# Patient Record
Sex: MALE | Race: WHITE | Employment: FULL TIME | URBAN - METROPOLITAN AREA
[De-identification: names, ages, dates, MRNs, and addresses within clinical notes are randomized per-mention and may not be internally consistent; named-entity substitution may affect disease eponyms.]

---

## 2022-02-11 ENCOUNTER — HOSPITAL ENCOUNTER (OUTPATIENT)
Age: 52
Setting detail: OUTPATIENT SURGERY
Discharge: HOME | End: 2022-02-11
Attending: OTOLARYNGOLOGY | Admitting: OTOLARYNGOLOGY
Payer: COMMERCIAL

## 2022-02-11 VITALS
BODY MASS INDEX: 21.98 KG/M2 | TEMPERATURE: 97.8 F | SYSTOLIC BLOOD PRESSURE: 121 MMHG | RESPIRATION RATE: 16 BRPM | HEIGHT: 68 IN | OXYGEN SATURATION: 98 % | DIASTOLIC BLOOD PRESSURE: 74 MMHG | WEIGHT: 145 LBS | HEART RATE: 69 BPM

## 2022-02-11 LAB — SARS-COV-2, NAAT: NOT DETECTED

## 2022-02-11 PROCEDURE — 3600000002 HC SURGERY LEVEL 2 BASE: Performed by: OTOLARYNGOLOGY

## 2022-02-11 PROCEDURE — 88305 TISSUE EXAM BY PATHOLOGIST: CPT

## 2022-02-11 PROCEDURE — 87635 SARS-COV-2 COVID-19 AMP PRB: CPT

## 2022-02-11 PROCEDURE — 2709999900 HC NON-CHARGEABLE SUPPLY: Performed by: OTOLARYNGOLOGY

## 2022-02-11 PROCEDURE — 2580000003 HC RX 258: Performed by: OTOLARYNGOLOGY

## 2022-02-11 PROCEDURE — 6370000000 HC RX 637 (ALT 250 FOR IP): Performed by: OTOLARYNGOLOGY

## 2022-02-11 PROCEDURE — A4217 STERILE WATER/SALINE, 500 ML: HCPCS | Performed by: OTOLARYNGOLOGY

## 2022-02-11 PROCEDURE — 3600000012 HC SURGERY LEVEL 2 ADDTL 15MIN: Performed by: OTOLARYNGOLOGY

## 2022-02-11 PROCEDURE — 2500000003 HC RX 250 WO HCPCS: Performed by: OTOLARYNGOLOGY

## 2022-02-11 RX ORDER — GINSENG 100 MG
CAPSULE ORAL PRN
Status: DISCONTINUED | OUTPATIENT
Start: 2022-02-11 | End: 2022-02-11 | Stop reason: ALTCHOICE

## 2022-02-11 RX ORDER — MAGNESIUM HYDROXIDE 1200 MG/15ML
LIQUID ORAL CONTINUOUS PRN
Status: COMPLETED | OUTPATIENT
Start: 2022-02-11 | End: 2022-02-11

## 2022-02-11 RX ORDER — LIDOCAINE HYDROCHLORIDE AND EPINEPHRINE 10; 10 MG/ML; UG/ML
INJECTION, SOLUTION INFILTRATION; PERINEURAL PRN
Status: DISCONTINUED | OUTPATIENT
Start: 2022-02-11 | End: 2022-02-11 | Stop reason: ALTCHOICE

## 2022-02-11 NOTE — BRIEF OP NOTE
Brief Postoperative Note      Patient: Dominick Freeman  YOB: 1970  MRN: [id number]    Date of Procedure: 2/11/2022    Pre-Op Diagnosis: SKIN LESION LEFT EYELID    Post-Op Diagnosis: Same       Procedure(s):  EXCISION SKIN LESION LEFT EYELID WITH CLOSURE    Surgeon(s):  Blanche Murrieta MD    Assistant:  First Assistant: Criselda Moore    Anesthesia: Local    Estimated Blood Loss (mL): Minimal    Complications: None    Specimens:   ID Type Source Tests Collected by Time Destination   A : LEFT EYELID LESION Tissue Face SURGICAL PATHOLOGY Blanche Murrieta MD 2/11/2022 7310        Implants:  * No implants in log *      Drains: * No LDAs found *    Findings: lesion left lateral canthus    Electronically signed by Blanche Murrieta MD on 2/11/2022 at 8:40 AM

## 2022-02-11 NOTE — FLOWSHEET NOTE
Discharge instruction given to patient along with ointment. Verbalizes understanding with no further questions. Incision site cdi with steristrips on, zuleika. Patient being discharged.

## 2022-02-11 NOTE — DISCHARGE INSTR - DIET

## 2022-02-11 NOTE — DISCHARGE INSTR - COC
Continuity of Care Form    Patient Name: Christa Higgins   :  1970  MRN:  [id number]    Admit date:  2022  Discharge date:  ***    Code Status Order: No Order   Advance Directives:   Advance Care Flowsheet Documentation       Date/Time Healthcare Directive Type of Healthcare Directive Copy in [address] Agent's Name Healthcare Agent's Phone Number    [phone] No, patient does not have an advance directive for healthcare treatment -- -- -- -- --            Admitting Physician:  Ceci Singletary MD  PCP: Virginie Naranjo DO    Discharging Nurse: [facility] Unit/Room#: [facility] [address] Unit Phone Number: ***    Emergency Contact:   Extended Emergency Contact Information  Primary Emergency Contact: Tee Dickerson  Address: [address] [place] [address], [address] [place] [address] Phone: [phone]  Relation: Spouse    Past Surgical History:  Past Surgical History:   Procedure Laterality Date    TONSILLECTOMY      when he was 11years old       Immunization History: There is no immunization history on file for this patient. Active Problems: There is no problem list on file for this patient.       Isolation/Infection:   Isolation            No Isolation          Patient Infection Status       None to display            Nurse Assessment:  Last Vital Signs: /74   Pulse 69   Temp 97.8 °F (36.6 °C) (Temporal)   Resp 16   Ht 5' 8\" (1.727 m)   Wt 145 lb (65.8 kg)   SpO2 98%   BMI 22.05 kg/m²     Last documented pain score (0-10 scale):    Last Weight:   Wt Readings from Last 1 Encounters:   22 145 lb (65.8 kg)     Mental Status:  {IP PT MENTAL STATUS:}    IV Access:  {[facility] [place]:993646263}    Nursing Mobility/ADLs:  Walking   {CHP DME ELYI:307109899}  Transfer  {CHP DME FUJI:091922681}  Bathing  {CHP DME GFRT:562602879}  Dressing  {CHP DME FNJU:985101951}  Toileting  {CHP DME DVJV:524866101}  Feeding  {CHP DME IINY:464379815}  Med Admin  {CHP DME ORL}  Med Delivery   { GERARD MED Delivery:948746247}    Wound Care Documentation and Therapy:        Elimination:  Continence: Bowel: {YES / PF:01166}  Bladder: {YES / TH:24288}  Urinary Catheter: {Urinary Catheter:579831004}   Colostomy/Ileostomy/Ileal Conduit: {YES / AX:17972}       Date of Last BM: ***  No intake or output data in the 24 hours ending 22 0842  No intake/output data recorded.     Safety Concerns:     [address] Safety Concerns:297230753}    Impairments/Disabilities:      [address] Impairments/Disabilities:728965809}    Nutrition Therapy:  Current Nutrition Therapy:   [address] Diet List:509407285}    Routes of Feeding: {CHP DME Other Feedings:316552555}  Liquids: {Slp liquid thickness:61453}  Daily Fluid Restriction: {CHP DME Yes amt example:033494123}  Last Modified Barium Swallow with Video (Video Swallowing Test): {Done Not Done WSIU:790503260}    Treatments at the Time of Hospital Discharge:   Respiratory Treatments: ***  Oxygen Therapy:  {Therapy; copd oxygen:98974}  Ventilator:    { CC Vent OEOS:066668906}    Rehab Therapies: {THERAPEUTIC INTERVENTION:1082521151}  Weight Bearing Status/Restrictions: [address] [employer] Weight Bearin}  Other Medical Equipment (for information only, NOT a DME order):  {EQUIPMENT:093849589}  Other Treatments: ***    Patient's personal belongings (please select all that are sent with patient):  {CHP DME Belongings:398870751}    RN SIGNATURE:  {Esignature:529537928}    CASE MANAGEMENT/SOCIAL WORK SECTION    Inpatient Status Date: ***    Readmission Risk Assessment Score:  Readmission Risk              Risk of Unplanned Readmission:  0           Discharging to Facility/ Agency   Name:   Address:  Phone:  Fax:    Dialysis Facility (if applicable)   Name:  Address:  Dialysis Schedule:  Phone:  Fax:    / signature: {Esignature:634384510}    PHYSICIAN SECTION    Prognosis: {Prognosis:2919142905}    Condition at Discharge: Vicente Hernandez Patient Condition:153738206}    Rehab Potential (if transferring to Rehab): {Prognosis:3626935265}    Recommended Labs or Other Treatments After Discharge: ***    Physician Certification: I certify the above information and transfer of Tosin Michaud  is necessary for the continuing treatment of the diagnosis listed and that he requires {Admit to Appropriate Level of Care:77600} for {GREATER/LESS:335565166} 30 days.      Update Admission H&P: {CHP DME Changes in PWLK:299562585}    PHYSICIAN SIGNATURE:  {Esignature:364979058}

## 2022-02-14 NOTE — OP NOTE
[address]                      [address], [address] [facility]                                OPERATIVE REPORT    PATIENT NAME: Sandra Cano               :        1970  MED REC NO:   [id number]                            ROOM:  ACCOUNT NO:   [de-identified]                           ADMIT DATE: 2022  PROVIDER:     Sissy Kennedy MD    DATE OF PROCEDURE:  2022    PREOPERATIVE DIAGNOSIS:  Skin lesion left eyelid area near the lateral  canthus. POSTOPERATIVE DIAGNOSIS:  Skin lesion left eyelid area near the lateral  canthus. OPERATION:  Excision lesion left eyelid area with primary closure. SURGEON:  Sissy Kennedy MD    ANESTHESIA:  Local 1% lidocaine with 1:100,000 epinephrine. INDICATIONS FOR OPERATION:  This is a 49-year-old male who was evaluated  in the office with a history of a skin lesion left eyelid area. The  patient after evaluation was recommended this surgery. He has been  explained the alternatives, risks, complications, outcome, and informed  consent obtained. OPERATIVE PROCEDURE:  The patient was brought to the operating room and  placed in supine position. The patient draped and monitoring in  progress. Usual prep and drape was done of the left eyelid area with  the special solution for the prep near the eye. The skin incision was  marked out near the lateral canthus of the eye and approximately 1 cm  away from the actual lateral canthus. This area was injected with local  anesthesia. Sufficient time was allowed for _____ to take place. This  lesion was excised in toto with an elliptical incision. Hemostasis was  obtained. Undermining was done and subcuticular sutures were used and  closure was obtained. The procedure was now terminated. The patient  was sent home with instructions and will be followed in the office  within next 2 weeks.         Bernie Kern MD    D: 2022 15:56:46       T: 02/14/2022 15:59:06     [email or web address]  Job#: [id number]     Doc#: [id number]    CC:

## 2024-06-29 PROBLEM — H02.9 EYELID LESION: Status: ACTIVE | Noted: 2024-06-29

## 2024-07-05 ENCOUNTER — APPOINTMENT (OUTPATIENT)
Dept: PRIMARY CARE | Facility: CLINIC | Age: 54
End: 2024-07-05

## 2024-08-05 ENCOUNTER — APPOINTMENT (OUTPATIENT)
Dept: PRIMARY CARE | Facility: CLINIC | Age: 54
End: 2024-08-05
Payer: COMMERCIAL

## 2024-08-05 VITALS
HEART RATE: 58 BPM | HEIGHT: 68 IN | OXYGEN SATURATION: 96 % | TEMPERATURE: 98 F | SYSTOLIC BLOOD PRESSURE: 106 MMHG | BODY MASS INDEX: 22.58 KG/M2 | WEIGHT: 149 LBS | DIASTOLIC BLOOD PRESSURE: 82 MMHG

## 2024-08-05 DIAGNOSIS — Z00.00 ANNUAL PHYSICAL EXAM: Primary | ICD-10-CM

## 2024-08-05 DIAGNOSIS — Z76.89 ENCOUNTER TO ESTABLISH CARE WITH NEW DOCTOR: ICD-10-CM

## 2024-08-05 DIAGNOSIS — L82.1 SEBORRHEIC KERATOSIS: ICD-10-CM

## 2024-08-05 DIAGNOSIS — I25.10 CORONARY ARTERY DISEASE INVOLVING NATIVE CORONARY ARTERY OF NATIVE HEART WITHOUT ANGINA PECTORIS: ICD-10-CM

## 2024-08-05 DIAGNOSIS — Z12.11 COLON CANCER SCREENING: ICD-10-CM

## 2024-08-05 DIAGNOSIS — R07.89 OTHER CHEST PAIN: ICD-10-CM

## 2024-08-05 LAB
POC APPEARANCE, URINE: ABNORMAL
POC BILIRUBIN, URINE: NEGATIVE
POC BLOOD, URINE: NEGATIVE
POC COLOR, URINE: YELLOW
POC GLUCOSE, URINE: NEGATIVE MG/DL
POC KETONES, URINE: NEGATIVE MG/DL
POC LEUKOCYTES, URINE: NEGATIVE
POC NITRITE,URINE: NEGATIVE
POC PH, URINE: 7 PH
POC PROTEIN, URINE: NEGATIVE MG/DL
POC SPECIFIC GRAVITY, URINE: 1.02
POC UROBILINOGEN, URINE: 0.2 EU/DL

## 2024-08-05 PROCEDURE — 81002 URINALYSIS NONAUTO W/O SCOPE: CPT | Performed by: FAMILY MEDICINE

## 2024-08-05 PROCEDURE — 17110 DESTRUCTION B9 LES UP TO 14: CPT | Performed by: FAMILY MEDICINE

## 2024-08-05 PROCEDURE — 93000 ELECTROCARDIOGRAM COMPLETE: CPT | Performed by: FAMILY MEDICINE

## 2024-08-05 PROCEDURE — 99396 PREV VISIT EST AGE 40-64: CPT | Performed by: FAMILY MEDICINE

## 2024-08-05 RX ORDER — POLYETHYLENE GLYCOL 3350, SODIUM CHLORIDE, SODIUM BICARBONATE AND POTASSIUM CHLORIDE 420; 5.72; 11.2; 1.48 G/4L; G/4L; G/4L; G/4L
4000 POWDER, FOR SOLUTION ORAL ONCE
Qty: 4000 ML | Refills: 0 | Status: SHIPPED | OUTPATIENT
Start: 2024-08-05 | End: 2024-08-05

## 2024-08-05 ASSESSMENT — PATIENT HEALTH QUESTIONNAIRE - PHQ9
1. LITTLE INTEREST OR PLEASURE IN DOING THINGS: NOT AT ALL
2. FEELING DOWN, DEPRESSED OR HOPELESS: NOT AT ALL
SUM OF ALL RESPONSES TO PHQ9 QUESTIONS 1 AND 2: 0

## 2024-08-05 ASSESSMENT — ENCOUNTER SYMPTOMS
DECREASED CONCENTRATION: 0
SPEECH DIFFICULTY: 0
TROUBLE SWALLOWING: 0
RHINORRHEA: 0
NERVOUS/ANXIOUS: 0
BLOOD IN STOOL: 0
FLANK PAIN: 0
CONFUSION: 0
RECTAL PAIN: 0
CHEST TIGHTNESS: 0
STRIDOR: 0
DYSURIA: 0
DEPRESSION: 0
ARTHRALGIAS: 0
COUGH: 0
FATIGUE: 1
EYE PAIN: 0
APPETITE CHANGE: 0
LOSS OF SENSATION IN FEET: 0
PALPITATIONS: 0
ABDOMINAL DISTENTION: 0
SINUS PAIN: 0
HEADACHES: 0
ADENOPATHY: 0
DIZZINESS: 0
SLEEP DISTURBANCE: 0
SEIZURES: 0
CONSTIPATION: 0
AGITATION: 0
FEVER: 0
OCCASIONAL FEELINGS OF UNSTEADINESS: 0
POLYPHAGIA: 0
NECK STIFFNESS: 0
ABDOMINAL PAIN: 0
SHORTNESS OF BREATH: 0
PHOTOPHOBIA: 0
SINUS PRESSURE: 0
POLYDIPSIA: 0
SORE THROAT: 0
DYSPHORIC MOOD: 0
COLOR CHANGE: 1
HEMATURIA: 0
DIARRHEA: 0
MYALGIAS: 0
ACTIVITY CHANGE: 0

## 2024-08-05 ASSESSMENT — PAIN SCALES - GENERAL: PAINLEVEL: 0-NO PAIN

## 2024-08-05 NOTE — PATIENT INSTRUCTIONS
Follow up 2 weeks after ct cardiac score/lab    Continue current medications and therapy for chronic medical conditions.    Patient was advised importance of proper diet/nutrition in addition adequate hydration. Patient was encouraged moderate exercise program to include 30 minutes daily for 5 days of the week or 150 minutes weekly. Patient will follow-up with us as scheduled.    Obtain Fasting Lipid, CMP, CBC, PSA, TSH and vitamin D     Complete Colonoscopy     IO EKG today     Cryosurgery for treatment of keratosis upper back    Obtain CT cardiac score    IO UA today     Schedule CT cardiac score

## 2024-08-05 NOTE — PROGRESS NOTES
Subjective   Patient ID: Clarke Ramirez is a 53 y.o. male who presents for \[facility]\"" Care.    HPI   Patient present to [facility] care   Seen Dr. Segovia in the past, 12 years ago  Recent PCP is Dr. 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 Kidd at [facility], surgical pathology available in chart   Follow up scheduled tomorrow 2024  Fam hx includes brother  from liver cancer and maternal grandmother  from colon cancer     States May 2024 he was at a concert and suddenly experienced chest pain, vomiting, and dizziness possibly d/t heat exhaustion. States he went to bed and woke up the next morning feeling better. Denies any episodes since then.     Review of Systems   Constitutional:  Positive for fatigue. Negative for activity change, appetite change and fever.   HENT:  Negative for congestion, dental problem, ear discharge, ear pain, mouth sores, rhinorrhea, sinus pressure, sinus pain, sore throat, tinnitus and trouble swallowing.    Eyes:  Negative for photophobia, pain and visual disturbance.   Respiratory:  Negative for cough, chest tightness, shortness of breath and stridor.    Cardiovascular:  Positive for chest pain (intermittent). Negative for palpitations and leg swelling.   Gastrointestinal:  Negative for abdominal distention, abdominal pain, blood in stool, constipation, diarrhea and rectal pain.   Endocrine: Negative for cold intolerance, heat intolerance, polydipsia, polyphagia and polyuria.   Genitourinary:  Negative for dysuria, flank pain, hematuria and urgency.   Musculoskeletal:  Negative for arthralgias, gait problem, myalgias and neck stiffness.   Skin:  Positive for color change. Negative for  "rash.   Allergic/Immunologic: Negative for environmental allergies and food allergies.   Neurological:  Negative for dizziness, seizures, syncope, speech difficulty and headaches.   Hematological:  Negative for adenopathy.   Psychiatric/Behavioral:  Negative for agitation, confusion, decreased concentration, dysphoric mood and sleep disturbance. The patient is not nervous/anxious.        Objective   /82 (BP Location: Right arm, Patient Position: Sitting, BP Cuff Size: Adult)   Pulse 58   Temp 36.7 °C (98 °F)   Ht 1.727 m (5' 8\")   Wt 67.6 kg (149 lb)   SpO2 96%   BMI 22.66 kg/m²     Physical Exam  Vitals reviewed.   Constitutional:       General: He is not in acute distress.     Appearance: Normal appearance. He is normal weight. He is not ill-appearing or diaphoretic.   HENT:      Head: Normocephalic.      Right Ear: Tympanic membrane and external ear normal.      Left Ear: Tympanic membrane and external ear normal.      Nose: Nose normal. No congestion.      Mouth/Throat:      Pharynx: No posterior oropharyngeal erythema.   Eyes:      General:         Right eye: No discharge.         Left eye: No discharge.      Extraocular Movements: Extraocular movements intact.      Conjunctiva/sclera: Conjunctivae normal.      Pupils: Pupils are equal, round, and reactive to light.   Cardiovascular:      Rate and Rhythm: Normal rate and regular rhythm.      Pulses: Normal pulses.      Heart sounds: Normal heart sounds. No murmur heard.  Pulmonary:      Effort: Pulmonary effort is normal. No respiratory distress.      Breath sounds: Normal breath sounds. No wheezing or rales.   Chest:      Chest wall: No tenderness.   Abdominal:      General: Abdomen is flat. Bowel sounds are normal. There is no distension.      Palpations: There is no mass.      Tenderness: There is no abdominal tenderness. There is no guarding.   Musculoskeletal:         General: No tenderness. Normal range of motion.      Cervical back: Normal " range of motion and neck supple. No tenderness.      Right lower leg: No edema.      Left lower leg: No edema.   Skin:     General: Skin is dry.      Coloration: Skin is not jaundiced.      Findings: Lesion present. No bruising, erythema or rash.      Comments: Seborrheic keratosis approximately 2.0 x 2.5 cm to mid upper back with no localized erythema   Neurological:      General: No focal deficit present.      Mental Status: He is alert and oriented to person, place, and time. Mental status is at baseline.      Cranial Nerves: No cranial nerve deficit.      Sensory: No sensory deficit.      Coordination: Coordination normal.      Gait: Gait normal.   Psychiatric:         Mood and Affect: Mood normal.         Thought Content: Thought content normal.         Judgment: Judgment normal.         Assessment/Plan   Problem List Items Addressed This Visit             ICD-10-CM    Coronary artery disease involving native coronary artery of native heart without angina pectoris I25.10    Relevant Orders    CT cardiac scoring wo IV contrast     Other Visit Diagnoses         Codes    Annual physical exam    -  Primary Z00.00    Relevant Orders    CBC and Auto Differential    Comprehensive Metabolic Panel    Lipid Panel    Prostate Specific Antigen, Screen    Vitamin D 25-Hydroxy,Total (for eval of Vitamin D levels)    Thyroid Stimulating Hormone    Encounter to establish care with new doctor     Z76.89    Colon cancer screening     Z12.11    Relevant Orders    Colonoscopy Screening; Average Risk Patient    Other chest pain     R07.89    Relevant Orders    ECG 12 lead (Clinic Performed)    POCT UA (nonautomated) manually resulted (Completed)    Seborrheic keratosis     L82.1              Scribe Attestation  By signing my name below, Diana ROJAS RMA , Scrdylan   attest that this documentation has been prepared under the direction and in the presence of Miguel Ángel Segovia DO.   Provider Attestation - Scribe  documentation    All medical record entries made by the Scribe were at my direction and personally dictated by me. I have reviewed the chart and agree that the record accurately reflects my personal performance of the history, physical exam, discussion and plan.    Patient ID: Clarke Ramirez is a 53 y.o. male.    Destruction of lesion    Date/Time: 8/5/2024 11:06 AM    Performed by: Miguel Ángel Segovia DO  Authorized by: Miguel Ángel Segovia DO    Number of Lesions: 1  Lesion 1:     Body area: trunk    Trunk location: back    Malignancy: benign lesion      Destruction method: cryotherapy

## 2024-08-28 ENCOUNTER — LAB (OUTPATIENT)
Dept: LAB | Facility: LAB | Age: 54
End: 2024-08-28
Payer: COMMERCIAL

## 2024-08-28 DIAGNOSIS — Z00.00 ANNUAL PHYSICAL EXAM: ICD-10-CM

## 2024-08-28 LAB
25(OH)D3 SERPL-MCNC: 21 NG/ML (ref 30–100)
ALBUMIN SERPL BCP-MCNC: 4.1 G/DL (ref 3.4–5)
ALP SERPL-CCNC: 47 U/L (ref 33–120)
ALT SERPL W P-5'-P-CCNC: 19 U/L (ref 10–52)
ANION GAP SERPL CALC-SCNC: 11 MMOL/L (ref 10–20)
AST SERPL W P-5'-P-CCNC: 15 U/L (ref 9–39)
BASOPHILS # BLD AUTO: 0.06 X10*3/UL (ref 0–0.1)
BASOPHILS NFR BLD AUTO: 0.8 %
BILIRUB SERPL-MCNC: 0.4 MG/DL (ref 0–1.2)
BUN SERPL-MCNC: 15 MG/DL (ref 6–23)
CALCIUM SERPL-MCNC: 9.7 MG/DL (ref 8.6–10.3)
CHLORIDE SERPL-SCNC: 105 MMOL/L (ref 98–107)
CHOLEST SERPL-MCNC: 161 MG/DL (ref 0–199)
CHOLESTEROL/HDL RATIO: 4.5
CO2 SERPL-SCNC: 30 MMOL/L (ref 21–32)
CREAT SERPL-MCNC: 0.97 MG/DL (ref 0.5–1.3)
EGFRCR SERPLBLD CKD-EPI 2021: >90 ML/MIN/1.73M*2
EOSINOPHIL # BLD AUTO: 0.12 X10*3/UL (ref 0–0.7)
EOSINOPHIL NFR BLD AUTO: 1.6 %
ERYTHROCYTE [DISTWIDTH] IN BLOOD BY AUTOMATED COUNT: 11.9 % (ref 11.5–14.5)
GLUCOSE SERPL-MCNC: 87 MG/DL (ref 74–99)
HCT VFR BLD AUTO: 42.5 % (ref 41–52)
HDLC SERPL-MCNC: 35.7 MG/DL
HGB BLD-MCNC: 14.2 G/DL (ref 13.5–17.5)
IMM GRANULOCYTES # BLD AUTO: 0.03 X10*3/UL (ref 0–0.7)
IMM GRANULOCYTES NFR BLD AUTO: 0.4 % (ref 0–0.9)
LDLC SERPL CALC-MCNC: 107 MG/DL
LYMPHOCYTES # BLD AUTO: 2.69 X10*3/UL (ref 1.2–4.8)
LYMPHOCYTES NFR BLD AUTO: 35.3 %
MCH RBC QN AUTO: 32 PG (ref 26–34)
MCHC RBC AUTO-ENTMCNC: 33.4 G/DL (ref 32–36)
MCV RBC AUTO: 96 FL (ref 80–100)
MONOCYTES # BLD AUTO: 0.58 X10*3/UL (ref 0.1–1)
MONOCYTES NFR BLD AUTO: 7.6 %
NEUTROPHILS # BLD AUTO: 4.13 X10*3/UL (ref 1.2–7.7)
NEUTROPHILS NFR BLD AUTO: 54.3 %
NON HDL CHOLESTEROL: 125 MG/DL (ref 0–149)
NRBC BLD-RTO: 0 /100 WBCS (ref 0–0)
PLATELET # BLD AUTO: 283 X10*3/UL (ref 150–450)
POTASSIUM SERPL-SCNC: 5.4 MMOL/L (ref 3.5–5.3)
PROT SERPL-MCNC: 6.4 G/DL (ref 6.4–8.2)
PSA SERPL-MCNC: 0.46 NG/ML
RBC # BLD AUTO: 4.44 X10*6/UL (ref 4.5–5.9)
SODIUM SERPL-SCNC: 141 MMOL/L (ref 136–145)
TRIGL SERPL-MCNC: 93 MG/DL (ref 0–149)
TSH SERPL-ACNC: 0.41 MIU/L (ref 0.44–3.98)
VLDL: 19 MG/DL (ref 0–40)
WBC # BLD AUTO: 7.6 X10*3/UL (ref 4.4–11.3)

## 2024-08-28 PROCEDURE — 84153 ASSAY OF PSA TOTAL: CPT

## 2024-08-28 PROCEDURE — 80061 LIPID PANEL: CPT

## 2024-08-28 PROCEDURE — 80053 COMPREHEN METABOLIC PANEL: CPT

## 2024-08-28 PROCEDURE — 85025 COMPLETE CBC W/AUTO DIFF WBC: CPT

## 2024-08-28 PROCEDURE — 84443 ASSAY THYROID STIM HORMONE: CPT

## 2024-08-28 PROCEDURE — 82306 VITAMIN D 25 HYDROXY: CPT

## 2024-08-28 PROCEDURE — 36415 COLL VENOUS BLD VENIPUNCTURE: CPT

## 2024-08-31 ENCOUNTER — HOSPITAL ENCOUNTER (OUTPATIENT)
Dept: RADIOLOGY | Facility: CLINIC | Age: 54
Discharge: HOME | End: 2024-08-31
Payer: COMMERCIAL

## 2024-08-31 DIAGNOSIS — I25.10 CORONARY ARTERY DISEASE INVOLVING NATIVE CORONARY ARTERY OF NATIVE HEART WITHOUT ANGINA PECTORIS: ICD-10-CM

## 2024-08-31 PROCEDURE — 75571 CT HRT W/O DYE W/CA TEST: CPT

## 2024-09-03 ENCOUNTER — TELEPHONE (OUTPATIENT)
Dept: PRIMARY CARE | Facility: CLINIC | Age: 54
End: 2024-09-03
Payer: COMMERCIAL

## 2024-09-04 ENCOUNTER — APPOINTMENT (OUTPATIENT)
Dept: PRIMARY CARE | Facility: CLINIC | Age: 54
End: 2024-09-04
Payer: COMMERCIAL

## 2024-09-04 DIAGNOSIS — E78.5 DYSLIPIDEMIA: ICD-10-CM

## 2024-09-04 DIAGNOSIS — Z71.2 ENCOUNTER TO DISCUSS TEST RESULTS: Primary | ICD-10-CM

## 2024-09-04 DIAGNOSIS — E55.9 VITAMIN D DEFICIENCY: ICD-10-CM

## 2024-09-04 PROCEDURE — 99442 PR PHYS/QHP TELEPHONE EVALUATION 11-20 MIN: CPT | Performed by: FAMILY MEDICINE

## 2024-09-04 RX ORDER — CHOLECALCIFEROL (VITAMIN D3) 50 MCG
50 TABLET ORAL DAILY
Qty: 90 TABLET | Refills: 1 | Status: SHIPPED | OUTPATIENT
Start: 2024-09-04 | End: 2025-09-04

## 2024-09-04 ASSESSMENT — ENCOUNTER SYMPTOMS
DYSPHORIC MOOD: 0
SHORTNESS OF BREATH: 0
APPETITE CHANGE: 0
CONSTITUTIONAL NEGATIVE: 1
FATIGUE: 0
BLOOD IN STOOL: 0
SINUS PRESSURE: 0
SINUS PAIN: 0
SPEECH DIFFICULTY: 0
FEVER: 0
TROUBLE SWALLOWING: 0
FLANK PAIN: 0
ARTHRALGIAS: 0
ABDOMINAL DISTENTION: 0
NERVOUS/ANXIOUS: 0
CONFUSION: 0
HEADACHES: 0
NECK STIFFNESS: 0
POLYPHAGIA: 0
HEMATURIA: 0
CHEST TIGHTNESS: 0
MYALGIAS: 0
RHINORRHEA: 0
SLEEP DISTURBANCE: 0
EYE PAIN: 0
DIZZINESS: 0
SEIZURES: 0
ACTIVITY CHANGE: 0
DYSURIA: 0
DIARRHEA: 0
COLOR CHANGE: 0
PHOTOPHOBIA: 0
STRIDOR: 0
ABDOMINAL PAIN: 0
PALPITATIONS: 0
POLYDIPSIA: 0
AGITATION: 0
DECREASED CONCENTRATION: 0
CONSTIPATION: 0
COUGH: 0
RECTAL PAIN: 0
ADENOPATHY: 0
SORE THROAT: 0

## 2024-09-04 NOTE — PATIENT INSTRUCTIONS
Follow up in 3 months    Continue current medications and therapy for chronic medical conditions.    Patient was advised importance of proper diet/nutrition in addition adequate hydration. Patient was encouraged moderate exercise program to include 30 minutes daily for 5 days of the week or 150 minutes weekly. Patient will follow-up with us as scheduled.    Review CT cardiac score from August 2024    Review lab results from August 2024    Start vitamin D 2000 international units daily

## 2024-09-04 NOTE — PROGRESS NOTES
Subjective   Patient ID: Clarke Ramirez is a 53 y.o. male who presents for Results.    Consent obtained by Clarke Ramirez for audio communication. Total time for this audio communication visit is 12 minutes.     HPI   The patient is in for his VV to go over ct results.    The patient prefers a phone call.     Review of Systems   Constitutional: Negative.  Negative for activity change, appetite change, fatigue and fever.   HENT:  Negative for congestion, dental problem, ear discharge, ear pain, mouth sores, rhinorrhea, sinus pressure, sinus pain, sore throat, tinnitus and trouble swallowing.    Eyes:  Negative for photophobia, pain and visual disturbance.   Respiratory:  Negative for cough, chest tightness, shortness of breath and stridor.    Cardiovascular:  Negative for chest pain and palpitations.   Gastrointestinal:  Negative for abdominal distention, abdominal pain, blood in stool, constipation, diarrhea and rectal pain.   Endocrine: Negative for cold intolerance, heat intolerance, polydipsia, polyphagia and polyuria.   Genitourinary:  Negative for dysuria, flank pain, hematuria and urgency.   Musculoskeletal:  Negative for arthralgias, gait problem, myalgias and neck stiffness.   Skin:  Negative for color change and rash.   Allergic/Immunologic: Negative for environmental allergies and food allergies.   Neurological:  Negative for dizziness, seizures, syncope, speech difficulty and headaches.   Hematological:  Negative for adenopathy.   Psychiatric/Behavioral:  Negative for agitation, confusion, decreased concentration, dysphoric mood and sleep disturbance. The patient is not nervous/anxious.        Objective   There were no vitals taken for this visit.    Physical Exam  Neurological:      Mental Status: He is alert and oriented to person, place, and time.   Psychiatric:         Mood and Affect: Mood normal.         Behavior: Behavior normal.         Thought Content: Thought content normal.        Constitutional: Well developed, well nourished, alert and in no acute distress   Psychiatric: Mood calm and affect normal    Telephone Visit - Audio Communication Only     Assessment/Plan   Problem List Items Addressed This Visit    None  Visit Diagnoses         Codes    Encounter to discuss test results    -  Primary Z71.2    Dyslipidemia     E78.5    Vitamin D deficiency     E55.9    Relevant Medications    cholecalciferol (Vitamin D-3) 50 MCG (2000 UT) tablet          Consent obtained by Clarke Ramirez for audio communication. Total time for this audio communication visit is 12 minutes.        Scribe Attestation  By signing my name below, I, ESCOBAR Littlejohn , Alibe   attest that this documentation has been prepared under the direction and in the presence of Miguel Ángel Segovia DO.   Provider Attestation - Scribe documentation    All medical record entries made by the Scribe were at my direction and personally dictated by me. I have reviewed the chart and agree that the record accurately reflects my personal performance of the history, physical exam, discussion and plan.

## 2024-10-18 PROBLEM — R05.3 PERSISTENT COUGH: Status: ACTIVE | Noted: 2024-10-18

## 2024-10-18 PROBLEM — J20.9 ACUTE BRONCHITIS: Status: ACTIVE | Noted: 2024-10-18

## 2024-10-18 PROBLEM — R06.09 DYSPNEA ON EXERTION: Status: ACTIVE | Noted: 2024-10-18

## 2024-11-01 ENCOUNTER — CLINICAL SUPPORT (OUTPATIENT)
Dept: PREADMISSION TESTING | Facility: HOSPITAL | Age: 54
End: 2024-11-01
Payer: COMMERCIAL

## 2024-11-01 VITALS — WEIGHT: 155 LBS | BODY MASS INDEX: 23.49 KG/M2 | HEIGHT: 68 IN

## 2024-11-01 RX ORDER — NAPROXEN 250 MG/1
500 TABLET ORAL 2 TIMES DAILY PRN
COMMUNITY

## 2024-11-01 NOTE — PREPROCEDURE INSTRUCTIONS

## 2024-11-05 ENCOUNTER — ANESTHESIA EVENT (OUTPATIENT)
Dept: GASTROENTEROLOGY | Facility: HOSPITAL | Age: 54
End: 2024-11-05
Payer: COMMERCIAL

## 2024-11-05 NOTE — ANESTHESIA PREPROCEDURE EVALUATION
Patient: Clarke Ramirez    Procedure Information       Date/Time: 11/06/24 1000    Scheduled providers: Joselo No MD; German Rodriguez DO    Procedure: COLONOSCOPY    Location: [facility]            Relevant Problems   Cardiac   (+) Coronary artery disease involving native coronary artery of native heart without angina pectoris      Pulmonary   (+) Dyspnea on exertion       Clinical information reviewed:                   NPO Detail:  No data recorded     Physical Exam    Airway  Mallampati: II     Cardiovascular   Rhythm: regular  Rate: normal     Dental    Pulmonary - normal exam     Abdominal - normal exam             Anesthesia Plan    History of general anesthesia?: yes  History of complications of general anesthesia?: no    ASA 3     MAC     intravenous induction   Postoperative administration of opioids is intended.  Anesthetic plan and risks discussed with patient.

## 2024-11-06 ENCOUNTER — HOSPITAL ENCOUNTER (OUTPATIENT)
Dept: GASTROENTEROLOGY | Facility: HOSPITAL | Age: 54
Setting detail: OUTPATIENT SURGERY
Discharge: HOME | End: 2024-11-06
Payer: COMMERCIAL

## 2024-11-06 ENCOUNTER — ANESTHESIA (OUTPATIENT)
Dept: GASTROENTEROLOGY | Facility: HOSPITAL | Age: 54
End: 2024-11-06
Payer: COMMERCIAL

## 2024-11-06 VITALS
WEIGHT: 144.4 LBS | BODY MASS INDEX: 21.89 KG/M2 | OXYGEN SATURATION: 100 % | SYSTOLIC BLOOD PRESSURE: 123 MMHG | TEMPERATURE: 97 F | DIASTOLIC BLOOD PRESSURE: 84 MMHG | HEART RATE: 72 BPM | HEIGHT: 68 IN | RESPIRATION RATE: 17 BRPM

## 2024-11-06 DIAGNOSIS — Z12.11 COLON CANCER SCREENING: ICD-10-CM

## 2024-11-06 PROCEDURE — 7100000009 HC PHASE TWO TIME - INITIAL BASE CHARGE

## 2024-11-06 PROCEDURE — 45380 COLONOSCOPY AND BIOPSY: CPT | Performed by: INTERNAL MEDICINE

## 2024-11-06 PROCEDURE — 2500000004 HC RX 250 GENERAL PHARMACY W/ HCPCS (ALT 636 FOR OP/ED): Performed by: NURSE ANESTHETIST, CERTIFIED REGISTERED

## 2024-11-06 PROCEDURE — 7100000010 HC PHASE TWO TIME - EACH INCREMENTAL 1 MINUTE

## 2024-11-06 PROCEDURE — 3700000002 HC GENERAL ANESTHESIA TIME - EACH INCREMENTAL 1 MINUTE

## 2024-11-06 PROCEDURE — 3700000001 HC GENERAL ANESTHESIA TIME - INITIAL BASE CHARGE

## 2024-11-06 RX ORDER — PROPOFOL 10 MG/ML
INJECTION, EMULSION INTRAVENOUS AS NEEDED
Status: DISCONTINUED | OUTPATIENT
Start: 2024-11-06 | End: 2024-11-06

## 2024-11-06 ASSESSMENT — PAIN SCALES - GENERAL
PAINLEVEL_OUTOF10: 0 - NO PAIN
PAIN_LEVEL: 0

## 2024-11-06 ASSESSMENT — PAIN - FUNCTIONAL ASSESSMENT
PAIN_FUNCTIONAL_ASSESSMENT: 0-10

## 2024-11-06 ASSESSMENT — COLUMBIA-SUICIDE SEVERITY RATING SCALE - C-SSRS
2. HAVE YOU ACTUALLY HAD ANY THOUGHTS OF KILLING YOURSELF?: NO
6. HAVE YOU EVER DONE ANYTHING, STARTED TO DO ANYTHING, OR PREPARED TO DO ANYTHING TO END YOUR LIFE?: NO
1. IN THE PAST MONTH, HAVE YOU WISHED YOU WERE DEAD OR WISHED YOU COULD GO TO SLEEP AND NOT WAKE UP?: NO

## 2024-11-06 NOTE — H&P
"Outpatient Hospital Procedure H&P    Patient Profile-Procedures  Initial Info  Patient Demographics  Name Clarke Ramirez  Date of Birth 1970  MRN [id number]  Address   [address] [place] [id number] [place] [place] [address]    Primary Phone Number [phone]  Secondary Phone Number    PCP Mayo Doshi    Procedure(s):  Colonoscopy  Primary contact name and number   Extended Emergency Contact Information  Primary Emergency Contact: Carrie Ramirez  Home Phone: [phone]  Relation: Spouse    General Health  Weight   Vitals:    11/06/24 0854   Weight: 65.5 kg (144 lb 6.4 oz)     BMI Body mass index is 21.96 kg/m².    Allergies  Allergies   Allergen Reactions    Bee Venom Protein (Honey Bee) Swelling       Past Medical History   Past Medical History:   Diagnosis Date    Allergic seasonal    Angina pectoris     was evaluated for \"chest pain,\" and given \"clean bill of health.\"    Chronic pain disorder     lower back    Fractures     left femur when 5 years old, broken foot in past (not sure of laterality), left 5th finger    Skin cancer of face     above left eye-cancer was removed    Wears glasses     reading only       Provider assessment  Diagnosis: Screening   Medication Reviewed - yes  Prior to Admission medications    Medication Sig Start Date End Date Taking? Authorizing Provider   cholecalciferol (Vitamin D-3) 50 MCG (2000 UT) tablet Take 1 tablet (50 mcg) by mouth once daily.  Patient not taking: Reported on 11/6/2024 9/4/24 9/4/25  Miguel Ángel Segovia,    naproxen (Naprosyn) 250 mg tablet Take 2 tablets (500 mg) by mouth 2 times a day as needed for mild pain (1 - 3).  Patient not taking: Reported on 11/6/2024    Historical Provider, MD       Physical Exam  Vitals:    11/06/24 0854   BP: 109/68   Pulse: 72   Resp: 16   Temp: 36.7 °C (98.1 °F)   SpO2: 98%        General: A&Ox3, NAD.  CV: RRR. No murmur.  Resp: CTA bilaterally. No wheezing, rhonchi or rales.   Extrem: No edema. "       Oropharyngeal Classification II (hard and soft palate, upper portion of tonsils and uvula visible)  ASA PS Classification 2  Sedation Plan Deep  Procedure Plan - pre-procedural (re)assesment completed by physician:  discharge/transfer patient when discharge criteria met    Joselo No MD  11/6/2024 9:05 AM

## 2024-11-06 NOTE — DISCHARGE INSTRUCTIONS
Patient Instructions after a Colonoscopy      The anesthetics, sedatives or narcotics which were given to you today will be acting in your body for the next 24 hours, so you might feel a little sleepy or groggy.  This feeling should slowly wear off. Carefully read and follow the instructions.     You received sedation today:  - Do not drive or operate any machinery or power tools of any kind.   - No alcoholic beverages today, not even beer or wine.  - Do not make any important decisions or sign any legal documents.  - No over the counter medications that contain alcohol or that may cause drowsiness.  -  While it is common to experience mild to moderate abdominal distention, gas, or belching after your procedure, if any of these symptoms occur following discharge from the GI Lab or within one week of having your procedure, call the [facility] to be advised whether a visit to your nearest Urgent Care or Emergency Department is indicated.  Take this paper with you if you go.     - If you develop an allergic reaction to the medications that were given during your procedure such as difficulty breathing, rash, hives, severe nausea, vomiting or lightheadedness.  - If you experience chest pain, shortness of breath, severe abdominal pain, fevers and chills.  -If you develop signs and symptoms of bleeding such as blood in your spit, if your stools turn black, tarry, or bloody  - If you have not urinated within 8 hours following your procedure.  - If your IV site becomes painful, red, inflamed, or looks infected.    If you received a biopsy/polypectomy/sphincterotomy the following instructions apply below:    __ Do not use Aspirin containing products, non-steroidal medications or anti-coagulants for one week following your procedure. (Examples of these types of medications are: Advil, Arthrotec, Aleve, Coumadin, Ecotrin, Heparin, Ibuprofen, Indocin, Motrin, Naprosyn, Nuprin, Plavix, Vioxx, and Voltarin, or  their generic forms.  This list is not all-inclusive.  Check with your physician or pharmacist before resuming medications.)   __ Eat a soft diet today.  Avoid foods that are poorly digested for the next 24 hours.  These foods would include: nuts, beans, lettuce, red meats, and fried foods. Start with liquids and advance your diet as tolerated, gradually work up to eating solids.   __ Do not have a Barium Study or Enema for one week.    Your physician recommends the additional following instructions:    -You have a contact number available for emergencies. The signs and symptoms of potential delayed complications were discussed with you. You may return to normal activities tomorrow.  -Resume your previous diet.  -Continue your present medications.   -We are waiting for your pathology results.  -Your physician has recommended a repeat colonoscopy (date to be determined after pending pathology results are reviewed) for surveillance based on pathology results.  -The findings and recommendations have been discussed with you.  -The findings and recommendations were discussed with your family.  - Please see Medication Reconciliation Form for new medication/medications prescribed.     Moderate Sedation in Adults Discharge Instructions    About this topic  Moderate sedation is also known as conscious sedation. It changes your state of being awake or consciousness. With this sedation, you may feel slight pain or pressure during a procedure. The drugs help you to relax and may even allow you to sleep. It will be easy to wake you and you may talk and answer questions while under sedation. Most likely, you will not remember what happens while under this sedation.  What care is needed at home?  Ask your doctor what you need to do when you go home. Make sure you understand everything the doctor says. This way you will know what you need to do.  You will not be allowed to drive right away after the procedure. Ask a family member  or a friend to drive you home.  Do not operate heavy or dangerous machinery for at least 24 hours.  Do not make major decisions or sign important papers for at least 24 hours. You may not be thinking clearly.  Avoid beer, wine, or mixed drinks (alcohol) for at least 24 hours.  You are at a higher risk of falling for at least 24 hours after moderate sedation.  Take extra care when you get up.  Do not change positions quickly.  Do not rush when you need to go to the bathroom or to answer the phone.  Ask for help if you feel unsteady when you try to walk.  Wear shoes with non-slip soles and low heels.  What follow-up care is needed?  Your doctor may ask you to make visits to the office to check on your progress. Be sure to keep these visits. Your doctor may also refer you to other doctors or tell you that you need more tests or care.  What drugs may be needed?  The doctor may order drugs to:  Help with pain  Treat an upset stomach or throwing up  Will physical activity be limited?  Rest for the day of the procedure. Avoid strenuous activities like heavy lifting and hard exercise. Talk to your doctor about whether you need to limit lifting or exercise after your procedure.  What changes to diet are needed?  Start with a light diet when you are fully awake. This includes things that are easy to swallow like soups, pudding, jello, toast, and eggs. Slowly progress to your normal diet.  What problems could happen?  Low blood pressure  Breathing problems  Upset stomach or throwing up  Dizziness  When do I need to call the doctor?  Feel dizzy, weak, or tired  Faint  Very bad headache  Upset stomach or throwing up  To follow up for more tests or care  Teach Back: Helping You Understand  The Teach Back Method helps you understand the information we are giving you. After you talk with the staff, tell them in your own words what you learned. This helps to make sure the staff has described each thing clearly. It also helps to  explain things that may have been confusing. Before going home, make sure you can do these:  I can tell you about my procedure.  I can tell you if I need more tests or care.  I can tell you what is good for me to eat and drink the next day.  I can tell you what I would do if I feel dizzy, weak, or tired.  Last Reviewed Date  2020-03-02    Physician phone number provided to patient upon discharge

## 2024-11-06 NOTE — Clinical Note
Huddle and Timeout completed together with team. Patient wristband and BEE information verified.  Anesthesia safety check completed

## 2024-11-06 NOTE — Clinical Note
Patient tolerated procedure well. Appears comfortable with no complaints of pain. VS stable. Arousable prior to transport. Patient transported to [facility] via cart.  Report called              . Handoff completed

## 2024-11-06 NOTE — ANESTHESIA POSTPROCEDURE EVALUATION
Patient: Clarke Ramirez    Procedure Summary       Date: 11/06/24 Room / Location: [facility]    Anesthesia Start: 1008 Anesthesia Stop: 1034    Procedure: COLONOSCOPY Diagnosis: Colon cancer screening    Scheduled Providers: Joselo No MD; German Rodriguez DO; Suzie Omalley RN; Mihaela Laguna Responsible Provider: German Rodriguez DO    Anesthesia Type: MAC ASA Status: 3            Anesthesia Type: MAC    Vitals Value Taken Time   BP 98/64 11/06/24 1034   Temp 36.5 11/06/24 1034   Pulse 72 11/06/24 1034   Resp 18 11/06/24 1034   SpO2 100 11/06/24 1034       Anesthesia Post Evaluation    Patient location during evaluation: bedside  Patient participation: complete - patient participated  Level of consciousness: awake and alert  Pain score: 0  Pain management: adequate  Airway patency: patent  Cardiovascular status: acceptable and stable  Respiratory status: acceptable and room air  Hydration status: acceptable  Postoperative Nausea and Vomiting: none      No notable events documented.

## 2024-11-13 LAB
LABORATORY COMMENT REPORT: NORMAL
PATH REPORT.FINAL DX SPEC: NORMAL
PATH REPORT.GROSS SPEC: NORMAL
PATH REPORT.RELEVANT HX SPEC: NORMAL
PATH REPORT.TOTAL CANCER: NORMAL

## 2025-08-07 ENCOUNTER — OFFICE VISIT (OUTPATIENT)
Dept: PRIMARY CARE | Facility: CLINIC | Age: 55
End: 2025-08-07
Payer: COMMERCIAL

## 2025-08-07 VITALS
RESPIRATION RATE: 16 BRPM | HEIGHT: 68 IN | BODY MASS INDEX: 22.43 KG/M2 | SYSTOLIC BLOOD PRESSURE: 106 MMHG | TEMPERATURE: 98.1 F | OXYGEN SATURATION: 96 % | WEIGHT: 148 LBS | DIASTOLIC BLOOD PRESSURE: 64 MMHG | HEART RATE: 84 BPM

## 2025-08-07 DIAGNOSIS — Z00.00 ANNUAL PHYSICAL EXAM: Primary | ICD-10-CM

## 2025-08-07 DIAGNOSIS — I25.10 CORONARY ARTERY DISEASE INVOLVING NATIVE CORONARY ARTERY OF NATIVE HEART WITHOUT ANGINA PECTORIS: ICD-10-CM

## 2025-08-07 PROCEDURE — 90471 IMMUNIZATION ADMIN: CPT | Performed by: FAMILY MEDICINE

## 2025-08-07 PROCEDURE — 90715 TDAP VACCINE 7 YRS/> IM: CPT | Performed by: FAMILY MEDICINE

## 2025-08-07 PROCEDURE — 99396 PREV VISIT EST AGE 40-64: CPT | Performed by: FAMILY MEDICINE

## 2025-08-07 ASSESSMENT — PATIENT HEALTH QUESTIONNAIRE - PHQ9
1. LITTLE INTEREST OR PLEASURE IN DOING THINGS: NOT AT ALL
SUM OF ALL RESPONSES TO PHQ9 QUESTIONS 1 AND 2: 0
2. FEELING DOWN, DEPRESSED OR HOPELESS: NOT AT ALL

## 2025-08-07 ASSESSMENT — ENCOUNTER SYMPTOMS
DEPRESSION: 0
OCCASIONAL FEELINGS OF UNSTEADINESS: 0
LOSS OF SENSATION IN FEET: 0

## 2025-08-07 NOTE — PROGRESS NOTES
Subjective   Patient ID: Clarke Ramirez is a 54 y.o. male who presents for Immunizations (Patient requesting tetanus shot today.//No other concerns reported today.) and Annual Exam (Patient presents today for annual check up ).  History of Present Illness  The patient is a 54-year-old  male who presents for an immunization update and annual checkup.    He reports no recent injuries or cuts but mentions that he frequently cuts himself. He is interested in updating his tetanus vaccine. His last tetanus vaccine was administered in 1993.    He is not experiencing any headaches, dizziness, respiratory issues, chest discomfort, or stomach problems. He is not interested in any lab work as he had it done recently. He is currently taking vitamin D 2000 IU daily and Naprosyn.    Review of Systems   Constitutional: Negative.  Negative for activity change, appetite change, fatigue and fever.   HENT:  Negative for congestion, dental problem, ear discharge, ear pain, mouth sores, rhinorrhea, sinus pressure, sinus pain, sore throat, tinnitus and trouble swallowing.    Eyes:  Negative for photophobia, pain and visual disturbance.   Respiratory:  Negative for cough, chest tightness, shortness of breath and stridor.    Cardiovascular:  Negative for chest pain and palpitations.   Gastrointestinal:  Negative for abdominal distention, abdominal pain, blood in stool, constipation, diarrhea and rectal pain.   Endocrine: Negative for cold intolerance, heat intolerance, polydipsia, polyphagia and polyuria.   Genitourinary:  Negative for dysuria, flank pain, hematuria and urgency.   Musculoskeletal:  Negative for arthralgias, gait problem, myalgias and neck stiffness.   Skin:  Negative for color change and rash.   Allergic/Immunologic: Negative for environmental allergies and food allergies.   Neurological:  Negative for dizziness, seizures, syncope, speech difficulty and headaches.   Hematological:  Negative for adenopathy.  "  Psychiatric/Behavioral:  Negative for agitation, confusion, decreased concentration, dysphoric mood and sleep disturbance. The patient is not nervous/anxious.    The above were reviewed and noted negative except as noted in HPI and Problem List.    Objective     /64 (BP Location: Right arm, Patient Position: Sitting, BP Cuff Size: Adult)   Pulse 84   Temp 36.7 °C (98.1 °F) (Temporal)   Resp 16   Ht 1.727 m (5' 8\")   Wt 67.1 kg (148 lb)   SpO2 96%   BMI 22.50 kg/m²      Physical Exam    Constitutional: Well developed, well nourished, alert and in no acute distress   Eyes: Normal external exam. Pupils equally round and reactive to light with normal accommodation and extraocular movements intact.  Neck: Supple, no lymphadenopathy or masses.   Cardiovascular: Regular rate and rhythm, normal S1 and S2, no murmurs, gallops, or rubs. Radial pulses normal. No peripheral edema.  Pulmonary: No respiratory distress, lungs clear to auscultation bilaterally. No wheezes, rhonchi, rales.  Abdomen: soft,non tender, non distended, without masses or HSM  Skin: Warm, well perfused, normal skin turgor and color.   Neurologic: Cranial nerves II-XII grossly intact.   Psychiatric: Mood calm and affect normal  Musculoskeletal: Moving all extremities without restriction  The above were reviewed and noted negative except as noted in HPI and Problem List.    Problem List Items Addressed This Visit       Coronary artery disease involving native coronary artery of native heart without angina pectoris     Other Visit Diagnoses         Annual physical exam    -  Primary    Relevant Orders    Comprehensive Metabolic Panel    CBC and Auto Differential    Lipid Panel    Prostate Specific Antigen, Screen             Assessment & Plan  1. Health maintenance.  - Blood pressure readings are within the normal range at 106/64.  - Coronary score from 08/2024 indicates a very low risk for heart disease, specifically coronary artery " blockage.  - Repeat blood test will be scheduled, including a PSA test.  - Tdap vaccine will be administered today; information about the Prevnar 20 vaccine has been provided.    2. Medication management.  - Currently taking vitamin D 2000 IU daily and Naprosyn.  - Previous lab results from 08/2024 show vitamin D at 21, PSA at 0.46, cholesterol at 161, HDL >40, LDL <100, and other chemistries within normal limits.  - No new medications or therapies prescribed.    Results  Labs   - Vitamin D: 08/2024, 21   - PSA for prostate: 08/2024, 0.46   - Cholesterol: 08/2024, 161   - HDL: 08/2024, 36   - Cholesterol ratio: 08/2024, 4.5   - LDL: 08/2024, 107   - Blood sugar: 08/2024, 87   - Sodium: 08/2024, 141   - Potassium: 08/2024, 5.4   - BUN: 08/2024, 15   - Creatinine: 08/2024, 0.97   - GFR: 08/2024, Greater than 90   - AST: 08/2024, 15   - ALT: 08/2024, 19   - Hemoglobin: 08/2024, 14.2 g/dL   - Hematocrit: 08/2024, 42.5%   - Platelet count: 08/2024, 283    Imaging   - Coronary score: 08/2024, 74       Miguel Ángel Segovia,      This medical note was created with the assistance of artificial intelligence (AI) for documentation purposes. The content has been reviewed and confirmed by the healthcare provider for accuracy and completeness. Patient consented to the use of audio recording and use of AI during their visit.

## 2025-09-03 ENCOUNTER — PATIENT MESSAGE (OUTPATIENT)
Dept: PRIMARY CARE | Facility: CLINIC | Age: 55
End: 2025-09-03
Payer: COMMERCIAL

## 2025-09-03 DIAGNOSIS — E55.9 VITAMIN D DEFICIENCY: ICD-10-CM

## 2025-09-03 RX ORDER — CHOLECALCIFEROL (VITAMIN D3) 50 MCG
50 TABLET ORAL DAILY
Qty: 90 TABLET | Refills: 0 | Status: SHIPPED | OUTPATIENT
Start: 2025-09-03 | End: 2026-09-03

## (undated) DEVICE — GAUZE,SPONGE,4"X4",16PLY,XRAY,STRL,LF: Brand: MEDLINE

## (undated) DEVICE — MARKER SURG SKIN GENTIAN VLT REG TIP W/ 6IN RUL

## (undated) DEVICE — PACK,EENT,TURBAN DRAPE,PK II: Brand: MEDLINE

## (undated) DEVICE — STANDARD HYPODERMIC NEEDLE,POLYPROPYLENE HUB: Brand: MONOJECT

## (undated) DEVICE — SKIN PREP TRAY 4 COMPARTM TRAY: Brand: MEDLINE INDUSTRIES, INC.

## (undated) DEVICE — SYRINGE MED 10ML LUERLOCK TIP W/O SFTY DISP

## (undated) DEVICE — INTENDED FOR TISSUE SEPARATION, AND OTHER PROCEDURES THAT REQUIRE A SHARP SURGICAL BLADE TO PUNCTURE OR CUT.: Brand: BARD-PARKER ® CARBON RIB-BACK BLADES

## (undated) DEVICE — CORD,CAUTERY,BIPOLAR,STERILE: Brand: MEDLINE

## (undated) DEVICE — PAD N ADH W3XL4IN POLY COT SFT PERF FLM EASILY CUT ABSRB

## (undated) DEVICE — LABEL MED MINI W/ MARKER

## (undated) DEVICE — GOWN,AURORA,NONREINFORCED,LARGE: Brand: MEDLINE

## (undated) DEVICE — GOLDVAC SLIM PUSH BUTTON SMOKE EVACUATION PENCIL 10 FT (3.0 M): Brand: GOLDVAC

## (undated) DEVICE — GLOVE ORANGE PI 7 1/2   MSG9075

## (undated) DEVICE — COUNTER NDL 40 COUNT HLD 70 FOAM BLK ADH W/ MAG

## (undated) DEVICE — TOWEL,OR,DSP,ST,BLUE,STD,4/PK,20PK/CS: Brand: MEDLINE

## (undated) DEVICE — SUTURE VCRL SZ 3-0 L18IN ABSRB UD PS-2 L19MM 3/8 CRV PRIM J497H